# Patient Record
Sex: FEMALE | Race: WHITE | NOT HISPANIC OR LATINO | RURAL
[De-identification: names, ages, dates, MRNs, and addresses within clinical notes are randomized per-mention and may not be internally consistent; named-entity substitution may affect disease eponyms.]

---

## 2020-12-11 ENCOUNTER — OFFICE VISIT (OUTPATIENT)
Dept: RURAL CLINIC 2 | Facility: CLINIC | Age: 30
End: 2020-12-11

## 2020-12-11 RX ORDER — DIPHENOXYLATE HCL/ATROPINE 2.5-.025MG
2 PO TID TABLET ORAL
Qty: 180 | Refills: 5 | Status: ACTIVE | COMMUNITY
Start: 2017-03-24

## 2020-12-11 RX ORDER — SERTRALINE 50 MG/1
1 TABLET TABLET, FILM COATED ORAL ONCE A DAY
Status: ACTIVE | COMMUNITY

## 2020-12-11 RX ORDER — PANTOPRAZOLE SODIUM 40 MG/1
1 TABLET TABLET, DELAYED RELEASE ORAL ONCE A DAY
Status: ACTIVE | COMMUNITY

## 2020-12-11 RX ORDER — BUPRENORPHINE HYDROCHLORIDE AND NALOXONE HYDROCHLORIDE DIHYDRATE 8; 2 MG/1; MG/1
1 TABLET UNDER THE TONGUE AND ALLOW TO DISSOLVE TABLET SUBLINGUAL ONCE A DAY
Status: ACTIVE | COMMUNITY

## 2020-12-11 RX ORDER — DIVALPROEX SODIUM 250 MG/1
1 TABLET TABLET, DELAYED RELEASE ORAL TWICE A DAY
Status: ACTIVE | COMMUNITY

## 2021-11-09 ENCOUNTER — WEB ENCOUNTER (OUTPATIENT)
Dept: RURAL CLINIC 2 | Facility: CLINIC | Age: 31
End: 2021-11-09

## 2021-11-09 ENCOUNTER — CLAIMS CREATED FROM THE CLAIM WINDOW (OUTPATIENT)
Dept: RURAL CLINIC 4 | Facility: CLINIC | Age: 31
End: 2021-11-09
Payer: MEDICAID

## 2021-11-09 ENCOUNTER — OFFICE VISIT (OUTPATIENT)
Dept: RURAL CLINIC 4 | Facility: CLINIC | Age: 31
End: 2021-11-09
Payer: MEDICAID

## 2021-11-09 DIAGNOSIS — K50.912 CROHN'S DISEASE WITH INTESTINAL OBSTRUCTION, UNSPECIFIED GASTROINTESTINAL TRACT LOCATION: ICD-10-CM

## 2021-11-09 DIAGNOSIS — Z98.890 HISTORY OF COLON SURGERY: ICD-10-CM

## 2021-11-09 DIAGNOSIS — Z86.19 HISTORY OF HELICOBACTER INFECTION: ICD-10-CM

## 2021-11-09 DIAGNOSIS — K56.7 ILEUS OF UNSPECIFIED TYPE: ICD-10-CM

## 2021-11-09 DIAGNOSIS — K52.9 IBD (INFLAMMATORY BOWEL DISEASE): ICD-10-CM

## 2021-11-09 PROCEDURE — 99205 OFFICE O/P NEW HI 60 MIN: CPT | Performed by: INTERNAL MEDICINE

## 2021-11-09 RX ORDER — MULTIVITAMIN
1 TABLET TABLET ORAL ONCE A DAY
Status: ACTIVE | COMMUNITY

## 2021-11-09 RX ORDER — HYDROMORPHONE HYDROCHLORIDE 2 MG/1
1 TABLET AS NEEDED TABLET ORAL
Status: ACTIVE | COMMUNITY

## 2021-11-09 RX ORDER — BISACODYL 5 MG
TAKE 4 TABLET, DELAYED RELEASE (ENTERIC COATED) ORAL
Qty: 4 | OUTPATIENT
Start: 2021-11-09 | End: 2021-11-10

## 2021-11-09 RX ORDER — PREDNISONE 20 MG/1
2 TABLETS TABLET ORAL ONCE A DAY
Qty: 60 TABLET | Refills: 4 | OUTPATIENT
Start: 2021-11-09 | End: 2022-04-08

## 2021-11-09 RX ORDER — SODIUM, POTASSIUM,MAG SULFATES 17.5-3.13G
177 ML SOLUTION, RECONSTITUTED, ORAL ORAL
Qty: 1 KIT | Refills: 0 | OUTPATIENT
Start: 2021-11-09

## 2021-11-09 RX ORDER — DIVALPROEX SODIUM 250 MG/1
1 TABLET TABLET, DELAYED RELEASE ORAL TWICE A DAY
Status: ACTIVE | COMMUNITY

## 2021-11-09 RX ORDER — PANTOPRAZOLE SODIUM 40 MG/1
1 TABLET TABLET, DELAYED RELEASE ORAL ONCE A DAY
Status: ACTIVE | COMMUNITY

## 2021-11-09 RX ORDER — DIPHENOXYLATE HCL/ATROPINE 2.5-.025MG
2 PO TID TABLET ORAL
Qty: 180 | Refills: 5 | Status: ACTIVE | COMMUNITY
Start: 2017-03-24

## 2021-11-09 RX ORDER — BUPRENORPHINE HYDROCHLORIDE AND NALOXONE HYDROCHLORIDE DIHYDRATE 8; 2 MG/1; MG/1
1 TABLET UNDER THE TONGUE AND ALLOW TO DISSOLVE TABLET SUBLINGUAL ONCE A DAY
Status: ACTIVE | COMMUNITY

## 2021-11-09 RX ORDER — SERTRALINE 50 MG/1
1 TABLET TABLET, FILM COATED ORAL ONCE A DAY
Status: ACTIVE | COMMUNITY

## 2021-11-09 RX ORDER — PREDNISONE 20 MG/1
1 TABLET TABLET ORAL ONCE A DAY
Status: ACTIVE | COMMUNITY

## 2021-11-09 NOTE — HPI-TODAY'S VISIT:
This patient comes to the office after not being seen by me since 2017. when I originally evaluated her in 2015 she came with a diagnosis of Crohn's disease.  She reported that this was diagnosed at the age of 19 and at some point she developed what sounded to be a bowel obstruction and underwent a right hemicolectomy with ileocolonic anastomosis.  I have not seen her at all since that time.  Most recently, she was admitted to the hospital with abdominal pain and vomiting.  CT scan showed a thickened area of inflammation on the small bowel side of her ileocolonic anastomosis result again small-bowel obstruction.  She was evaluated by surgery and managed conservatively.  She did not require operation.  She was started on IV prednisone and eventually this improved was presumed to be a inflammatory stricture related to her untreated Crohn's disease.  She is currently on prednisone 40 mg daily.

## 2021-12-23 ENCOUNTER — OFFICE VISIT (OUTPATIENT)
Dept: RURAL MEDICAL CENTER 4 | Facility: MEDICAL CENTER | Age: 31
End: 2021-12-23

## 2022-01-22 PROBLEM — 119981000119101: Status: ACTIVE | Noted: 2021-11-12

## 2022-01-22 PROBLEM — 81060008: Status: ACTIVE | Noted: 2021-11-12

## 2022-01-25 ENCOUNTER — TELEPHONE ENCOUNTER (OUTPATIENT)
Dept: URBAN - METROPOLITAN AREA CLINIC 92 | Facility: CLINIC | Age: 32
End: 2022-01-25

## 2022-02-18 ENCOUNTER — OFFICE VISIT (OUTPATIENT)
Dept: RURAL MEDICAL CENTER 2 | Facility: MEDICAL CENTER | Age: 32
End: 2022-02-18
Payer: MEDICAID

## 2022-02-18 DIAGNOSIS — K50.00 CICATRIZING ENTEROCOLITIS: ICD-10-CM

## 2022-02-18 DIAGNOSIS — Z98.0 H/O BILLROTH II OPERATION: ICD-10-CM

## 2022-02-18 PROCEDURE — 45380 COLONOSCOPY AND BIOPSY: CPT | Performed by: INTERNAL MEDICINE

## 2022-02-18 RX ORDER — SERTRALINE 50 MG/1
1 TABLET TABLET, FILM COATED ORAL ONCE A DAY
Status: ACTIVE | COMMUNITY

## 2022-02-18 RX ORDER — PREDNISONE 20 MG/1
1 TABLET TABLET ORAL ONCE A DAY
Status: ACTIVE | COMMUNITY

## 2022-02-18 RX ORDER — SODIUM, POTASSIUM,MAG SULFATES 17.5-3.13G
177 ML SOLUTION, RECONSTITUTED, ORAL ORAL
Qty: 1 KIT | Refills: 0 | Status: ACTIVE | COMMUNITY
Start: 2021-11-09

## 2022-02-18 RX ORDER — HYDROMORPHONE HYDROCHLORIDE 2 MG/1
1 TABLET AS NEEDED TABLET ORAL
Status: ACTIVE | COMMUNITY

## 2022-02-18 RX ORDER — DIVALPROEX SODIUM 250 MG/1
1 TABLET TABLET, DELAYED RELEASE ORAL TWICE A DAY
Status: ACTIVE | COMMUNITY

## 2022-02-18 RX ORDER — BUPRENORPHINE HYDROCHLORIDE AND NALOXONE HYDROCHLORIDE DIHYDRATE 8; 2 MG/1; MG/1
1 TABLET UNDER THE TONGUE AND ALLOW TO DISSOLVE TABLET SUBLINGUAL ONCE A DAY
Status: ACTIVE | COMMUNITY

## 2022-02-18 RX ORDER — PREDNISONE 20 MG/1
2 TABLETS TABLET ORAL ONCE A DAY
Qty: 60 TABLET | Refills: 4 | Status: ACTIVE | COMMUNITY
Start: 2021-11-09 | End: 2022-04-08

## 2022-02-18 RX ORDER — PANTOPRAZOLE SODIUM 40 MG/1
1 TABLET TABLET, DELAYED RELEASE ORAL ONCE A DAY
Status: ACTIVE | COMMUNITY

## 2022-02-18 RX ORDER — MULTIVITAMIN
1 TABLET TABLET ORAL ONCE A DAY
Status: ACTIVE | COMMUNITY

## 2022-02-18 RX ORDER — DIPHENOXYLATE HCL/ATROPINE 2.5-.025MG
2 PO TID TABLET ORAL
Qty: 180 | Refills: 5 | Status: ACTIVE | COMMUNITY
Start: 2017-03-24

## 2022-04-12 ENCOUNTER — OFFICE VISIT (OUTPATIENT)
Dept: RURAL CLINIC 4 | Facility: CLINIC | Age: 32
End: 2022-04-12

## 2022-04-12 RX ORDER — MULTIVITAMIN
1 TABLET TABLET ORAL ONCE A DAY
COMMUNITY

## 2022-04-12 RX ORDER — SERTRALINE 50 MG/1
1 TABLET TABLET, FILM COATED ORAL ONCE A DAY
COMMUNITY

## 2022-04-12 RX ORDER — PANTOPRAZOLE SODIUM 40 MG/1
1 TABLET TABLET, DELAYED RELEASE ORAL ONCE A DAY
COMMUNITY

## 2022-04-12 RX ORDER — DIVALPROEX SODIUM 250 MG/1
1 TABLET TABLET, DELAYED RELEASE ORAL TWICE A DAY
COMMUNITY

## 2022-04-12 RX ORDER — BUPRENORPHINE HYDROCHLORIDE AND NALOXONE HYDROCHLORIDE DIHYDRATE 8; 2 MG/1; MG/1
1 TABLET UNDER THE TONGUE AND ALLOW TO DISSOLVE TABLET SUBLINGUAL ONCE A DAY
COMMUNITY

## 2022-04-12 RX ORDER — SODIUM, POTASSIUM,MAG SULFATES 17.5-3.13G
177 ML SOLUTION, RECONSTITUTED, ORAL ORAL
Qty: 1 KIT | Refills: 0 | COMMUNITY
Start: 2021-11-09

## 2022-04-12 RX ORDER — DIPHENOXYLATE HCL/ATROPINE 2.5-.025MG
2 PO TID TABLET ORAL
Qty: 180 | Refills: 5 | COMMUNITY
Start: 2017-03-24

## 2022-04-12 RX ORDER — PREDNISONE 20 MG/1
1 TABLET TABLET ORAL ONCE A DAY
COMMUNITY

## 2022-04-12 RX ORDER — HYDROMORPHONE HYDROCHLORIDE 2 MG/1
1 TABLET AS NEEDED TABLET ORAL
COMMUNITY

## 2022-09-21 ENCOUNTER — OFFICE VISIT (OUTPATIENT)
Dept: RURAL CLINIC 8 | Facility: CLINIC | Age: 32
End: 2022-09-21
Payer: MEDICAID

## 2022-09-21 ENCOUNTER — WEB ENCOUNTER (OUTPATIENT)
Dept: RURAL CLINIC 2 | Facility: CLINIC | Age: 32
End: 2022-09-21

## 2022-09-21 VITALS
BODY MASS INDEX: 25.34 KG/M2 | TEMPERATURE: 98 F | SYSTOLIC BLOOD PRESSURE: 130 MMHG | HEART RATE: 76 BPM | HEIGHT: 63 IN | DIASTOLIC BLOOD PRESSURE: 86 MMHG | WEIGHT: 143 LBS

## 2022-09-21 DIAGNOSIS — K50.018 CROHN'S DISEASE OF SMALL INTESTINE WITH OTHER COMPLICATION: ICD-10-CM

## 2022-09-21 DIAGNOSIS — K52.9 IBD (INFLAMMATORY BOWEL DISEASE): ICD-10-CM

## 2022-09-21 DIAGNOSIS — R14.0 BLOATING: ICD-10-CM

## 2022-09-21 DIAGNOSIS — R10.31 RLQ ABDOMINAL PAIN: ICD-10-CM

## 2022-09-21 PROBLEM — 56689002: Status: ACTIVE | Noted: 2022-09-21

## 2022-09-21 PROBLEM — 24526004: Status: ACTIVE | Noted: 2021-11-12

## 2022-09-21 PROCEDURE — 99214 OFFICE O/P EST MOD 30 MIN: CPT | Performed by: INTERNAL MEDICINE

## 2022-09-21 RX ORDER — PANTOPRAZOLE SODIUM 40 MG/1
1 TABLET TABLET, DELAYED RELEASE ORAL ONCE A DAY
Status: ACTIVE | COMMUNITY

## 2022-09-21 RX ORDER — DIVALPROEX SODIUM 250 MG/1
1 TABLET TABLET, DELAYED RELEASE ORAL TWICE A DAY
Status: ACTIVE | COMMUNITY

## 2022-09-21 RX ORDER — PREDNISONE 20 MG/1
1 TABLET TABLET ORAL ONCE A DAY
Status: ACTIVE | COMMUNITY

## 2022-09-21 RX ORDER — MULTIVITAMIN
1 TABLET TABLET ORAL ONCE A DAY
Status: ACTIVE | COMMUNITY

## 2022-09-21 RX ORDER — HYDROMORPHONE HYDROCHLORIDE 2 MG/1
1 TABLET AS NEEDED TABLET ORAL
Status: DISCONTINUED | COMMUNITY

## 2022-09-21 RX ORDER — DIPHENOXYLATE HCL/ATROPINE 2.5-.025MG
2 PO TID TABLET ORAL AS NEEDED
Refills: 5 | Status: ACTIVE | COMMUNITY
Start: 2017-03-24

## 2022-09-21 RX ORDER — MESALAMINE 500 MG/1
3 CAPSULES CAPSULE ORAL BID
Qty: 180 CAPSULE | Refills: 6 | OUTPATIENT
Start: 2022-09-21 | End: 2023-04-19

## 2022-09-21 RX ORDER — SODIUM, POTASSIUM,MAG SULFATES 17.5-3.13G
177 ML SOLUTION, RECONSTITUTED, ORAL ORAL
Qty: 1 KIT | Refills: 0 | Status: DISCONTINUED | COMMUNITY
Start: 2021-11-09

## 2022-09-21 RX ORDER — SERTRALINE 50 MG/1
1 TABLET TABLET, FILM COATED ORAL ONCE A DAY
Status: ACTIVE | COMMUNITY

## 2022-09-21 RX ORDER — BUPRENORPHINE HYDROCHLORIDE AND NALOXONE HYDROCHLORIDE DIHYDRATE 8; 2 MG/1; MG/1
1 TABLET UNDER THE TONGUE AND ALLOW TO DISSOLVE TABLET SUBLINGUAL ONCE A DAY
Status: ACTIVE | COMMUNITY

## 2022-09-21 NOTE — HPI-TODAY'S VISIT:
This patient comes to the office after not being seen by me since 2017. when I originally evaluated her in 2015 she came with a diagnosis of Crohn's disease.  She reported that this was diagnosed at the age of 19 and at some point she developed what sounded to be a bowel obstruction and underwent a right hemicolectomy with ileocolonic anastomosis.  I have not seen her at all since that time.  Most recently, she was admitted to the hospital with abdominal pain and vomiting.  CT scan showed a thickened area of inflammation on the small bowel side of her ileocolonic anastomosis result again small-bowel obstruction.  She was evaluated by surgery and managed conservatively.  She did not require operation.  She was started on IV prednisone and eventually this improved was presumed to be a inflammatory stricture related to her untreated Crohn's disease.  She is currently on prednisone 40 mg daily. -   01/20/2022: On February the year I performed a colonoscopy follow-up Crohn disease.  The prep there were numerous solid stool in right colon.  The colonic anastomosis was relatively free from inflammation.  Biopsies of this area and the colon were normal. -  I want a follow back up with her in the office, initiate treatment for Crohn's, and then repeat her colonoscopy to both better evaluate the right colon since it was full of stool and also evaluate response to treatment.  She returned 8 months later for a follow-up visit following her procedure. - she has been cutting the prednisone down and has weaned herself down to 5mg daily for the last month or so. she stopped smoking. has been doing okay.

## 2022-09-22 ENCOUNTER — TELEPHONE ENCOUNTER (OUTPATIENT)
Dept: RURAL CLINIC 8 | Facility: CLINIC | Age: 32
End: 2022-09-22

## 2022-09-22 RX ORDER — MESALAMINE 500 MG/1
3 CAPSULES CAPSULE ORAL BID
Qty: 180 CAPSULE | Refills: 6
Start: 2022-09-21 | End: 2023-04-20

## 2022-09-28 ENCOUNTER — TELEPHONE ENCOUNTER (OUTPATIENT)
Dept: URBAN - METROPOLITAN AREA CLINIC 92 | Facility: CLINIC | Age: 32
End: 2022-09-28

## 2022-09-30 ENCOUNTER — TELEPHONE ENCOUNTER (OUTPATIENT)
Dept: RURAL CLINIC 8 | Facility: CLINIC | Age: 32
End: 2022-09-30

## 2022-10-03 ENCOUNTER — TELEPHONE ENCOUNTER (OUTPATIENT)
Dept: URBAN - METROPOLITAN AREA CLINIC 92 | Facility: CLINIC | Age: 32
End: 2022-10-03

## 2022-10-03 RX ORDER — MESALAMINE 1.2 G/1
2 TABLETS WITH A MEAL TABLET, DELAYED RELEASE ORAL ONCE A DAY
Qty: 60 | Refills: 5 | OUTPATIENT
Start: 2022-10-07 | End: 2023-04-05

## 2022-10-18 ENCOUNTER — DASHBOARD ENCOUNTERS (OUTPATIENT)
Age: 32
End: 2022-10-18

## 2022-10-25 PROBLEM — 161617006: Status: ACTIVE | Noted: 2021-11-12

## 2022-10-25 PROBLEM — 34000006: Status: ACTIVE | Noted: 2021-11-09

## 2022-10-26 ENCOUNTER — OFFICE VISIT (OUTPATIENT)
Dept: RURAL CLINIC 8 | Facility: CLINIC | Age: 32
End: 2022-10-26

## 2022-10-26 RX ORDER — MESALAMINE 1.2 G/1
2 TABLETS WITH A MEAL TABLET, DELAYED RELEASE ORAL ONCE A DAY
Qty: 60 | Refills: 5 | Status: ACTIVE | COMMUNITY
Start: 2022-10-07 | End: 2023-04-05

## 2022-10-26 RX ORDER — DIVALPROEX SODIUM 250 MG/1
1 TABLET TABLET, DELAYED RELEASE ORAL TWICE A DAY
Status: ACTIVE | COMMUNITY

## 2022-10-26 RX ORDER — PANTOPRAZOLE SODIUM 40 MG/1
1 TABLET TABLET, DELAYED RELEASE ORAL ONCE A DAY
Status: ACTIVE | COMMUNITY

## 2022-10-26 RX ORDER — DIPHENOXYLATE HCL/ATROPINE 2.5-.025MG
2 PO TID TABLET ORAL AS NEEDED
Refills: 5 | Status: ACTIVE | COMMUNITY
Start: 2017-03-24

## 2022-10-26 RX ORDER — PREDNISONE 20 MG/1
1 TABLET TABLET ORAL ONCE A DAY
Status: ACTIVE | COMMUNITY

## 2022-10-26 RX ORDER — MESALAMINE 500 MG/1
3 CAPSULES CAPSULE ORAL BID
Qty: 180 CAPSULE | Refills: 6 | Status: ACTIVE | COMMUNITY
Start: 2022-09-21 | End: 2023-04-20

## 2022-10-26 RX ORDER — MULTIVITAMIN
1 TABLET TABLET ORAL ONCE A DAY
Status: ACTIVE | COMMUNITY

## 2022-10-26 RX ORDER — SERTRALINE 50 MG/1
1 TABLET TABLET, FILM COATED ORAL ONCE A DAY
Status: ACTIVE | COMMUNITY

## 2022-10-26 RX ORDER — BUPRENORPHINE HYDROCHLORIDE AND NALOXONE HYDROCHLORIDE DIHYDRATE 8; 2 MG/1; MG/1
1 TABLET UNDER THE TONGUE AND ALLOW TO DISSOLVE TABLET SUBLINGUAL ONCE A DAY
Status: ACTIVE | COMMUNITY

## 2022-10-26 NOTE — HPI-TODAY'S VISIT:
This patient comes to the office after not being seen by me since 2017. when I originally evaluated her in 2015 she came with a diagnosis of Crohn's disease.  She reported that this was diagnosed at the age of 19 and at some point she developed what sounded to be a bowel obstruction and underwent a right hemicolectomy with ileocolonic anastomosis.  I have not seen her at all since that time.  Most recently, she was admitted to the hospital with abdominal pain and vomiting.  CT scan showed a thickened area of inflammation on the small bowel side of her ileocolonic anastomosis result again small-bowel obstruction.  She was evaluated by surgery and managed conservatively.  She did not require operation.  She was started on IV prednisone and eventually this improved was presumed to be a inflammatory stricture related to her untreated Crohn's disease.  She is currently on prednisone 40 mg daily. -   01/20/2022: On February the year I performed a colonoscopy follow-up Crohn disease.  The prep there were numerous solid stool in right colon.  The colonic anastomosis was relatively free from inflammation.  Biopsies of this area and the colon were normal. -  I want a follow back up with her in the office, initiate treatment for Crohn's, and then repeat her colonoscopy to both better evaluate the right colon since it was full of stool and also evaluate response to treatment.  She returned 8 months later for a follow-up visit following her procedure. - she has been cutting the prednisone down and has weaned herself down to 5mg daily for the last month or so. she stopped smoking. has been doing okay. -  10/26/2022: Patient comes for follow-up PU prescribed Pentasa at her last visit last month to treat her ileal Crohn's disease.  Of course her insurance would not pay for it and made us try Lialda which does not even target the small bowel.

## 2023-02-16 ENCOUNTER — TELEPHONE ENCOUNTER (OUTPATIENT)
Dept: URBAN - METROPOLITAN AREA CLINIC 105 | Facility: CLINIC | Age: 33
End: 2023-02-16

## 2023-02-16 RX ORDER — PREDNISONE 5 MG/1
1 TABLET TABLET ORAL
Qty: 30 | Refills: 0